# Patient Record
Sex: MALE | Race: WHITE | NOT HISPANIC OR LATINO
[De-identification: names, ages, dates, MRNs, and addresses within clinical notes are randomized per-mention and may not be internally consistent; named-entity substitution may affect disease eponyms.]

---

## 2016-09-21 RX ORDER — LEVOTHYROXINE SODIUM 125 MCG
1 TABLET ORAL
Qty: 14 | Refills: 0 | DISCHARGE
Start: 2016-09-21 | End: 2016-10-05

## 2016-09-21 RX ORDER — METOPROLOL TARTRATE 50 MG
1 TABLET ORAL
Qty: 28 | Refills: 0 | DISCHARGE
Start: 2016-09-21 | End: 2016-10-05

## 2017-01-12 ENCOUNTER — NON-APPOINTMENT (OUTPATIENT)
Age: 72
End: 2017-01-12

## 2017-01-12 ENCOUNTER — APPOINTMENT (OUTPATIENT)
Dept: ELECTROPHYSIOLOGY | Facility: CLINIC | Age: 72
End: 2017-01-12

## 2017-01-12 VITALS — HEART RATE: 84 BPM | DIASTOLIC BLOOD PRESSURE: 73 MMHG | SYSTOLIC BLOOD PRESSURE: 126 MMHG | OXYGEN SATURATION: 98 %

## 2017-07-19 ENCOUNTER — OUTPATIENT (OUTPATIENT)
Dept: OUTPATIENT SERVICES | Facility: HOSPITAL | Age: 72
LOS: 1 days | End: 2017-07-19
Payer: MEDICARE

## 2017-07-19 VITALS
RESPIRATION RATE: 16 BRPM | WEIGHT: 225.09 LBS | DIASTOLIC BLOOD PRESSURE: 77 MMHG | TEMPERATURE: 98 F | HEART RATE: 63 BPM | SYSTOLIC BLOOD PRESSURE: 183 MMHG | OXYGEN SATURATION: 99 %

## 2017-07-19 DIAGNOSIS — Z98.890 OTHER SPECIFIED POSTPROCEDURAL STATES: Chronic | ICD-10-CM

## 2017-07-19 DIAGNOSIS — I25.10 ATHEROSCLEROTIC HEART DISEASE OF NATIVE CORONARY ARTERY WITHOUT ANGINA PECTORIS: ICD-10-CM

## 2017-07-19 DIAGNOSIS — Z98.1 ARTHRODESIS STATUS: Chronic | ICD-10-CM

## 2017-07-19 DIAGNOSIS — Z01.818 ENCOUNTER FOR OTHER PREPROCEDURAL EXAMINATION: ICD-10-CM

## 2017-07-19 LAB
ALBUMIN SERPL ELPH-MCNC: 4.1 G/DL — SIGNIFICANT CHANGE UP (ref 3.3–5)
ALP SERPL-CCNC: 28 U/L — LOW (ref 40–120)
ALT FLD-CCNC: 15 U/L RC — SIGNIFICANT CHANGE UP (ref 10–45)
ANION GAP SERPL CALC-SCNC: 8 MMOL/L — SIGNIFICANT CHANGE UP (ref 5–17)
AST SERPL-CCNC: 24 U/L — SIGNIFICANT CHANGE UP (ref 10–40)
BILIRUB SERPL-MCNC: 0.3 MG/DL — SIGNIFICANT CHANGE UP (ref 0.2–1.2)
BUN SERPL-MCNC: 32 MG/DL — HIGH (ref 7–23)
CALCIUM SERPL-MCNC: 9.4 MG/DL — SIGNIFICANT CHANGE UP (ref 8.4–10.5)
CHLORIDE SERPL-SCNC: 99 MMOL/L — SIGNIFICANT CHANGE UP (ref 96–108)
CO2 SERPL-SCNC: 30 MMOL/L — SIGNIFICANT CHANGE UP (ref 22–31)
CREAT SERPL-MCNC: 1.59 MG/DL — HIGH (ref 0.5–1.3)
GLUCOSE SERPL-MCNC: 183 MG/DL — HIGH (ref 70–99)
HCT VFR BLD CALC: 32 % — LOW (ref 39–50)
HGB BLD-MCNC: 10.3 G/DL — LOW (ref 13–17)
MCHC RBC-ENTMCNC: 31.8 PG — SIGNIFICANT CHANGE UP (ref 27–34)
MCHC RBC-ENTMCNC: 32.3 GM/DL — SIGNIFICANT CHANGE UP (ref 32–36)
MCV RBC AUTO: 98.4 FL — SIGNIFICANT CHANGE UP (ref 80–100)
PLATELET # BLD AUTO: 308 K/UL — SIGNIFICANT CHANGE UP (ref 150–400)
POTASSIUM SERPL-MCNC: 4.7 MMOL/L — SIGNIFICANT CHANGE UP (ref 3.5–5.3)
POTASSIUM SERPL-SCNC: 4.7 MMOL/L — SIGNIFICANT CHANGE UP (ref 3.5–5.3)
PROT SERPL-MCNC: 9.3 G/DL — HIGH (ref 6–8.3)
RBC # BLD: 3.25 M/UL — LOW (ref 4.2–5.8)
RBC # FLD: 21 % — HIGH (ref 10.3–14.5)
SODIUM SERPL-SCNC: 137 MMOL/L — SIGNIFICANT CHANGE UP (ref 135–145)
WBC # BLD: 6.8 K/UL — SIGNIFICANT CHANGE UP (ref 3.8–10.5)
WBC # FLD AUTO: 6.8 K/UL — SIGNIFICANT CHANGE UP (ref 3.8–10.5)

## 2017-07-19 PROCEDURE — 77370 RADIATION PHYSICS CONSULT: CPT

## 2017-07-19 PROCEDURE — 77316 BRACHYTX ISODOSE PLAN SIMPLE: CPT | Mod: 26

## 2017-07-19 PROCEDURE — 92920 PRQ TRLUML C ANGIOP 1ART&/BR: CPT | Mod: RC

## 2017-07-19 PROCEDURE — C1894: CPT

## 2017-07-19 PROCEDURE — C1717: CPT

## 2017-07-19 PROCEDURE — C1887: CPT

## 2017-07-19 PROCEDURE — 77770 HDR RDNCL NTRSTL/ICAV BRCHTX: CPT | Mod: 26

## 2017-07-19 PROCEDURE — C1769: CPT

## 2017-07-19 PROCEDURE — 80053 COMPREHEN METABOLIC PANEL: CPT

## 2017-07-19 PROCEDURE — 77316 BRACHYTX ISODOSE PLAN SIMPLE: CPT

## 2017-07-19 PROCEDURE — 93005 ELECTROCARDIOGRAM TRACING: CPT

## 2017-07-19 PROCEDURE — 77770 HDR RDNCL NTRSTL/ICAV BRCHTX: CPT

## 2017-07-19 PROCEDURE — 85027 COMPLETE CBC AUTOMATED: CPT

## 2017-07-19 PROCEDURE — 99221 1ST HOSP IP/OBS SF/LOW 40: CPT | Mod: 25

## 2017-07-19 PROCEDURE — 77470 SPECIAL RADIATION TREATMENT: CPT | Mod: 26

## 2017-07-19 PROCEDURE — 77470 SPECIAL RADIATION TREATMENT: CPT

## 2017-07-19 PROCEDURE — 77263 THER RADIOLOGY TX PLNG CPLX: CPT

## 2017-07-19 PROCEDURE — C1725: CPT

## 2017-07-19 PROCEDURE — 77290 THER RAD SIMULAJ FIELD CPLX: CPT | Mod: 26

## 2017-07-19 PROCEDURE — 93010 ELECTROCARDIOGRAM REPORT: CPT | Mod: 76

## 2017-07-19 PROCEDURE — 77290 THER RAD SIMULAJ FIELD CPLX: CPT

## 2017-07-19 PROCEDURE — 77336 RADIATION PHYSICS CONSULT: CPT

## 2017-07-19 NOTE — H&P CARDIOLOGY - PSH
CBG (Coronary Bypass Graft)  2003  S/P Angioplasty    S/P laminectomy with spinal fusion  L4; 2006 CBG (Coronary Bypass Graft)  2003  History of cardiac cath    S/P Angioplasty    S/P laminectomy with spinal fusion  L4; 2006

## 2017-07-19 NOTE — H&P CARDIOLOGY - PMH
Bladder cancer  1995  Chronic back pain    Chronic Gout    Diabetes Type 2, Controlled    HTN (Hypertension)    Hypercholesteremia    Other Chronic Pain Anemia    Atrial fibrillation    Bladder cancer  1995  Cellulitis    Chronic back pain    Chronic Gout    Diabetes Type 2, Controlled    HTN (Hypertension)    Hypercholesteremia    Myeloma    Old non-ST elevation myocardial infarction (NSTEMI)    Other Chronic Pain    Presence of cardiac pacemaker

## 2017-07-19 NOTE — H&P CARDIOLOGY - HISTORY OF PRESENT ILLNESS
71 yr Male with PMH HTN, HLD, CAD with PCI sept 2017 ( On Brilinta, stopped Aspirin 4 weeks ago as per cards), 3 Vessel CABG 2003, DM Type 2 ( managed by PCP Karlene Chapman, w/o any complications, well managed as per patient A1C 7.0 X6 months ago), hypothyroidism, chronic back pain secondary to fall, gout, hx bladder cancer, depression, on Eliquis ( last dose Tuesday morning) patient denies hx of Afib ( however Afib is part of PMH in Md note) pt unsure of the reason why he takes Eliquis, PPM implant sept 2017 This is a 71 yr Male, former smoker,  with PMH of HTN, HLD, CAD with PCI ( last PCI sept 2016, On Brilinta, stopped Aspirin 4 weeks ago as per cards), 3 Vessel CABG 2003, DM Type 2 ( managed by PCP Karlene Chapman, w/o any complications, well managed as per patient A1C 7.0 X6 months ago), hypothyroidism, chronic back pain secondary to fall, gout, bladder cancer 10 years ago, depression, on Eliquis ( last dose Tuesday morning) patient denies hx of Afib ( however Afib is part of PMH in Md note) pt unsure of the reason why he takes Eliquis, PPM implant sept 2016, NSTEMI 06/2017, recently dx with Myeloma x2 weeks ( pt had bone marrow aspiration, oncologist Willy Perez, no treatment plan yet).  Patient was in [facility] x8 days for chief complaint of dyspnea fond to be anemic, had colonoscopy and EGD, s/p X7 PRBC units,  had a cardiac angiogram which showed in stent restenosis, was referred to [facility] cath lab for brachytherapy today.  Currently asymptomatic denies any chest pain, dyspnea, dizziness, palpitations, N&V, ALVARENGA.     cards Md Pablo Mccray

## 2020-03-10 PROBLEM — I25.2 OLD MYOCARDIAL INFARCTION: Chronic | Status: ACTIVE | Noted: 2017-07-19

## 2020-03-10 PROBLEM — Z95.0 PRESENCE OF CARDIAC PACEMAKER: Chronic | Status: ACTIVE | Noted: 2017-07-19

## 2020-03-10 PROBLEM — D64.9 ANEMIA, UNSPECIFIED: Chronic | Status: ACTIVE | Noted: 2017-07-19

## 2020-03-10 PROBLEM — L03.90 CELLULITIS, UNSPECIFIED: Chronic | Status: ACTIVE | Noted: 2017-07-19

## 2020-03-10 PROBLEM — I48.91 UNSPECIFIED ATRIAL FIBRILLATION: Chronic | Status: ACTIVE | Noted: 2017-07-19

## 2020-03-10 PROBLEM — C90.00 MULTIPLE MYELOMA NOT HAVING ACHIEVED REMISSION: Chronic | Status: ACTIVE | Noted: 2017-07-19

## 2020-03-11 ENCOUNTER — OUTPATIENT (OUTPATIENT)
Dept: OUTPATIENT SERVICES | Facility: HOSPITAL | Age: 75
LOS: 1 days | End: 2020-03-11
Payer: MEDICARE

## 2020-03-11 VITALS
WEIGHT: 199.96 LBS | HEART RATE: 60 BPM | SYSTOLIC BLOOD PRESSURE: 158 MMHG | DIASTOLIC BLOOD PRESSURE: 68 MMHG | RESPIRATION RATE: 16 BRPM | OXYGEN SATURATION: 99 % | HEIGHT: 71 IN

## 2020-03-11 DIAGNOSIS — Z98.890 OTHER SPECIFIED POSTPROCEDURAL STATES: Chronic | ICD-10-CM

## 2020-03-11 DIAGNOSIS — I25.10 ATHEROSCLEROTIC HEART DISEASE OF NATIVE CORONARY ARTERY WITHOUT ANGINA PECTORIS: ICD-10-CM

## 2020-03-11 DIAGNOSIS — Z98.1 ARTHRODESIS STATUS: Chronic | ICD-10-CM

## 2020-03-11 LAB
ANION GAP SERPL CALC-SCNC: 15 MMOL/L — SIGNIFICANT CHANGE UP (ref 5–17)
BUN SERPL-MCNC: 31 MG/DL — HIGH (ref 7–23)
CALCIUM SERPL-MCNC: 9.7 MG/DL — SIGNIFICANT CHANGE UP (ref 8.4–10.5)
CHLORIDE SERPL-SCNC: 102 MMOL/L — SIGNIFICANT CHANGE UP (ref 96–108)
CO2 SERPL-SCNC: 23 MMOL/L — SIGNIFICANT CHANGE UP (ref 22–31)
CREAT SERPL-MCNC: 1.69 MG/DL — HIGH (ref 0.5–1.3)
GLUCOSE BLDC GLUCOMTR-MCNC: 117 MG/DL — HIGH (ref 70–99)
GLUCOSE BLDC GLUCOMTR-MCNC: 134 MG/DL — HIGH (ref 70–99)
GLUCOSE BLDC GLUCOMTR-MCNC: 154 MG/DL — HIGH (ref 70–99)
GLUCOSE SERPL-MCNC: 164 MG/DL — HIGH (ref 70–99)
HCT VFR BLD CALC: 31 % — LOW (ref 39–50)
HGB BLD-MCNC: 9.7 G/DL — LOW (ref 13–17)
MCHC RBC-ENTMCNC: 31.3 GM/DL — LOW (ref 32–36)
MCHC RBC-ENTMCNC: 31.7 PG — SIGNIFICANT CHANGE UP (ref 27–34)
MCV RBC AUTO: 101.3 FL — HIGH (ref 80–100)
NRBC # BLD: 0 /100 WBCS — SIGNIFICANT CHANGE UP (ref 0–0)
PLATELET # BLD AUTO: 253 K/UL — SIGNIFICANT CHANGE UP (ref 150–400)
POTASSIUM SERPL-MCNC: 4.2 MMOL/L — SIGNIFICANT CHANGE UP (ref 3.5–5.3)
POTASSIUM SERPL-SCNC: 4.2 MMOL/L — SIGNIFICANT CHANGE UP (ref 3.5–5.3)
RBC # BLD: 3.06 M/UL — LOW (ref 4.2–5.8)
RBC # FLD: 15.7 % — HIGH (ref 10.3–14.5)
SODIUM SERPL-SCNC: 140 MMOL/L — SIGNIFICANT CHANGE UP (ref 135–145)
WBC # BLD: 9.93 K/UL — SIGNIFICANT CHANGE UP (ref 3.8–10.5)
WBC # FLD AUTO: 9.93 K/UL — SIGNIFICANT CHANGE UP (ref 3.8–10.5)

## 2020-03-11 PROCEDURE — 77290 THER RAD SIMULAJ FIELD CPLX: CPT | Mod: 26

## 2020-03-11 PROCEDURE — 77470 SPECIAL RADIATION TREATMENT: CPT

## 2020-03-11 PROCEDURE — 82962 GLUCOSE BLOOD TEST: CPT

## 2020-03-11 PROCEDURE — C1728: CPT

## 2020-03-11 PROCEDURE — C1769: CPT

## 2020-03-11 PROCEDURE — 85027 COMPLETE CBC AUTOMATED: CPT

## 2020-03-11 PROCEDURE — 92974 CATH PLACE CARDIO BRACHYTX: CPT

## 2020-03-11 PROCEDURE — 99153 MOD SED SAME PHYS/QHP EA: CPT

## 2020-03-11 PROCEDURE — 99152 MOD SED SAME PHYS/QHP 5/>YRS: CPT

## 2020-03-11 PROCEDURE — 77318 BRACHYTX ISODOSE COMPLEX: CPT

## 2020-03-11 PROCEDURE — 80048 BASIC METABOLIC PNL TOTAL CA: CPT

## 2020-03-11 PROCEDURE — 77470 SPECIAL RADIATION TREATMENT: CPT | Mod: 26

## 2020-03-11 PROCEDURE — C1887: CPT

## 2020-03-11 PROCEDURE — C1753: CPT

## 2020-03-11 PROCEDURE — C1725: CPT

## 2020-03-11 PROCEDURE — 93010 ELECTROCARDIOGRAM REPORT: CPT | Mod: 77

## 2020-03-11 PROCEDURE — C1717: CPT

## 2020-03-11 PROCEDURE — 77770 HDR RDNCL NTRSTL/ICAV BRCHTX: CPT

## 2020-03-11 PROCEDURE — 77770 HDR RDNCL NTRSTL/ICAV BRCHTX: CPT | Mod: 26

## 2020-03-11 PROCEDURE — 77263 THER RADIOLOGY TX PLNG CPLX: CPT

## 2020-03-11 PROCEDURE — 77370 RADIATION PHYSICS CONSULT: CPT

## 2020-03-11 PROCEDURE — C1894: CPT

## 2020-03-11 PROCEDURE — 77290 THER RAD SIMULAJ FIELD CPLX: CPT

## 2020-03-11 PROCEDURE — 92978 ENDOLUMINL IVUS OCT C 1ST: CPT | Mod: RC

## 2020-03-11 PROCEDURE — 99221 1ST HOSP IP/OBS SF/LOW 40: CPT | Mod: 25

## 2020-03-11 PROCEDURE — 77318 BRACHYTX ISODOSE COMPLEX: CPT | Mod: 26

## 2020-03-11 PROCEDURE — 93005 ELECTROCARDIOGRAM TRACING: CPT

## 2020-03-11 PROCEDURE — 92937 PRQ TRLUML REVSC CAB GRF 1: CPT | Mod: RC

## 2020-03-11 PROCEDURE — 93010 ELECTROCARDIOGRAM REPORT: CPT

## 2020-03-11 RX ORDER — ESOMEPRAZOLE MAGNESIUM 40 MG/1
1 CAPSULE, DELAYED RELEASE ORAL
Qty: 0 | Refills: 0 | DISCHARGE

## 2020-03-11 RX ORDER — ATORVASTATIN CALCIUM 80 MG/1
1 TABLET, FILM COATED ORAL
Qty: 0 | Refills: 0 | DISCHARGE

## 2020-03-11 RX ORDER — INSULIN DETEMIR 100/ML (3)
60 INSULIN PEN (ML) SUBCUTANEOUS
Qty: 0 | Refills: 0 | DISCHARGE

## 2020-03-11 RX ORDER — GABAPENTIN 400 MG/1
1 CAPSULE ORAL
Qty: 0 | Refills: 0 | DISCHARGE

## 2020-03-11 RX ORDER — BUDESONIDE AND FORMOTEROL FUMARATE DIHYDRATE 160; 4.5 UG/1; UG/1
2 AEROSOL RESPIRATORY (INHALATION)
Qty: 0 | Refills: 0 | DISCHARGE

## 2020-03-11 RX ORDER — RANOLAZINE 500 MG/1
1 TABLET, FILM COATED, EXTENDED RELEASE ORAL
Qty: 0 | Refills: 0 | DISCHARGE

## 2020-03-11 RX ORDER — OXYCODONE HYDROCHLORIDE 5 MG/1
15 TABLET ORAL ONCE
Refills: 0 | Status: DISCONTINUED | OUTPATIENT
Start: 2020-03-11 | End: 2020-03-11

## 2020-03-11 RX ORDER — LEVOTHYROXINE SODIUM 125 MCG
1 TABLET ORAL
Qty: 0 | Refills: 0 | DISCHARGE

## 2020-03-11 RX ORDER — RANOLAZINE 500 MG/1
2 TABLET, FILM COATED, EXTENDED RELEASE ORAL
Qty: 0 | Refills: 0 | DISCHARGE

## 2020-03-11 RX ADMIN — OXYCODONE HYDROCHLORIDE 15 MILLIGRAM(S): 5 TABLET ORAL at 15:50

## 2020-03-11 NOTE — CHART NOTE - NSCHARTNOTEFT_GEN_A_CORE
Patient requesting oxycodone 15mg takes it at home for back/ leg pain from his myeloma CA . ISTOP  reference number #: [id number] did verify that patient was on this medication at home last filled in January 2020 .

## 2020-03-11 NOTE — H&P CARDIOLOGY - HISTORY OF PRESENT ILLNESS
This is a 74 yr  Male, former smoker,  with PMH of HTN, HLD, CAD with PCI ( last PCI sept 2016, On Brilinta, stopped Aspirin 4 weeks ago as per cards), 3 Vessel CABG 2003, DM Type 2 ( managed by PCP Karlene Chapman, w/o any complications, well managed as per patient A1C 7.0 X6 months ago), hypothyroidism, chronic back pain secondary to fall, gout, bladder cancer 10 years ago, depression, on Eliquis ( last dose Tuesday morning) patient denies hx of Afib ( however Afib is part of PMH in Md note) pt unsure of the reason why he takes Eliquis, PPM implant sept 2016, NSTEMI 06/2017, recently dx with Myeloma x2 weeks ( pt had bone marrow aspiration, oncologist Willy Perez, no treatment plan yet).  Patient was in [facility] x8 days for chief complaint of dyspnea fond to be anemic, had colonoscopy and EGD, s/p X7 PRBC units,  had a cardiac angiogram which showed in stent restenosis, was referred to [facility] cath lab for brachytherapy today.  Currently asymptomatic denies any chest pain, dyspnea, dizziness, palpitations, N&V, ALVARENGA.     cards Md Pablo Mccray This is a 74 yr  Male with no implantable devices noted pt with PMH of HTN, HLD, CAD with PCI ( last PCI sept 2016, On Brilinta, Aspirin 81mg po daily, 3 Vessel CABG 2003,Anemia  DM Type 2 ( managed by PCP Karlene Chapman, w/o any complications, well managed), Hypothyroidism, chronic back pain secondary to fall, gout, bladder cancer 10 years ago, depression, patient denies hx of Afib ( however Afib is part of PMH in Md note) PPM implant sept 2016, NSTEMI 06/2017, recently dx with Myeloma x2 weeks ( pt had bone marrow aspiration, oncologist Willy Perez, last Chemo treatment was 2 years ago ). cardiac angiogram which showed in stent restenosis. Now presents at [facility] cath lab for brachytherapy today.  Currently asymptomatic denies any chest pain, dyspnea, dizziness, palpitations, N&V, HA.   < from: Transthoracic Echocardiogram (09.16.16 @ 14:35) >  Conclusions:  *** Technically difficult and limited study.  1. Thickened pericardium with small pericardial effusion.  ------------------------------------------------------------------------  Confirmed on  9/16/2016 - 18:11:24 by Papito Kiran MD  ------------------------------------------------------------------------    < end of copied text >  < from: Cardiac Cath Lab - Adult (07.19.17 @ 10:47) >  CORONARY VESSELS: The coronary circulation is right dominant.  LM:   --  LM: This vessel was not injected.  LAD:   --  LAD: This vessel was not injected.  CX:   --  Circumflex: This vessel was not injected.  RCA:   --  RCA: distal luminal disease  GRAFTS:   --  Graft to the distal RCA: The graft was a saphenous vein graft  from the aorta. There was a 70 % stenosis in the middle third of the  graft.  COMPLICATIONS: There were no complications.  SUMMARY:  1ST LESION INTERVENTIONS: The mid SVG to the RCA was treated with a 3.0  Angiosculpt device. Brachytherapy was performed delivering 23Gr for 5min  6sec.  DIAGNOSTIC RECOMMENDATIONS: The SVG was successfully treated with an  Angiosculpt balloon followed by Brachy therapy. Continued medical therapy  is advised.  INTERVENTIONAL RECOMMENDATIONS: The SVG was successfully treated with an  Angiosculpt balloon followed by Brachy therapy. Continued medical therapy  is advised.  Prepared and signed by  Marquis Dodge M.D.  Signed 07/19/2017 11:34:56    < end of copied text >    cards Md Pablo Mccray This is a 74 yr  Male with PPM,  PMH of HTN, HLD, CAD with PCI ( last PCI sept 2016, On Brilinta, Aspirin 81mg po daily, 3 Vessel CABG 2003,hx afib no longer taking Eliquis Anemia  DM Type 2 ( managed by PCP Karlene Chapman, w/o any complications, well managed), Hypothyroidism, chronic back pain secondary to fall, gout, bladder cancer 10 years ago, depression, PPM implant sept 2016, NSTEMI 06/2017, recently dx with Myeloma x2 weeks ( pt had bone marrow aspiration, oncologist Willy Perez, last Chemo treatment was 2 years ago ). Cardiac angiogram which showed in stent restenosis. Pt had recent Cardiac Cath at [facility] on 2/14/20 showed mildly elevated Left Ventricular End Diastolic pressure, LIMA graft to LAD is patent, Saphenous Vein graft to RPDA with stenosis s/p Successful PCI with PTCA of the SVG to the Right PDA .  Now presents at [facility] for scheduled Staged brachytherapy today.  Currently asymptomatic denies any chest pain, dyspnea, dizziness, palpitations, N&V, HA.     < from: Transthoracic Echocardiogram (09.16.16 @ 14:35) >  Conclusions:  *** Technically difficult and limited study.  1. Thickened pericardium with small pericardial effusion.  ------------------------------------------------------------------------  Confirmed on  9/16/2016 - 18:11:24 by Papito Kiran MD  ------------------------------------------------------------------------    < end of copied text >  < from: Cardiac Cath Lab - Adult (07.19.17 @ 10:47) >  CORONARY VESSELS: The coronary circulation is right dominant.  LM:   --  LM: This vessel was not injected.  LAD:   --  LAD: This vessel was not injected.  CX:   --  Circumflex: This vessel was not injected.  RCA:   --  RCA: distal luminal disease  GRAFTS:   --  Graft to the distal RCA: The graft was a saphenous vein graft  from the aorta. There was a 70 % stenosis in the middle third of the  graft.  COMPLICATIONS: There were no complications.  SUMMARY:  1ST LESION INTERVENTIONS: The mid SVG to the RCA was treated with a 3.0  Angiosculpt device. Brachytherapy was performed delivering 23Gr for 5min  6sec.  DIAGNOSTIC RECOMMENDATIONS: The SVG was successfully treated with an  Angiosculpt balloon followed by Brachy therapy. Continued medical therapy  is advised.  INTERVENTIONAL RECOMMENDATIONS: The SVG was successfully treated with an  Angiosculpt balloon followed by Brachy therapy. Continued medical therapy  is advised.  Prepared and signed by  Marquis Dodge M.D.  Signed 07/19/2017 11:34:56    < end of copied text >    cards Md Pablo Mccray

## 2020-03-11 NOTE — H&P CARDIOLOGY - PMH
Anemia    Atrial fibrillation    Bladder cancer  1995  Cellulitis    Chronic back pain    Chronic Gout    Diabetes Type 2, Controlled    HTN (Hypertension)    Hypercholesteremia    Myeloma    Old non-ST elevation myocardial infarction (NSTEMI)    Other Chronic Pain    Presence of cardiac pacemaker

## 2020-03-11 NOTE — CHART NOTE - NSCHARTNOTEFT_GEN_A_CORE
Patient takes aspirin 81mg ORAL daily , added to medications discharge sheet and patient advised to continue his aspirin 81mg PO QD.

## 2020-03-11 NOTE — H&P CARDIOLOGY - PSH
CBG (Coronary Bypass Graft)  2003  History of cardiac cath    S/P Angioplasty    S/P laminectomy with spinal fusion  L4; 2006

## 2021-10-11 ENCOUNTER — OUTPATIENT (OUTPATIENT)
Dept: OUTPATIENT SERVICES | Facility: HOSPITAL | Age: 76
LOS: 1 days | End: 2021-10-11
Payer: MEDICARE

## 2021-10-11 DIAGNOSIS — Z98.1 ARTHRODESIS STATUS: Chronic | ICD-10-CM

## 2021-10-11 DIAGNOSIS — Z98.890 OTHER SPECIFIED POSTPROCEDURAL STATES: Chronic | ICD-10-CM

## 2021-10-11 DIAGNOSIS — Z11.52 ENCOUNTER FOR SCREENING FOR COVID-19: ICD-10-CM

## 2021-10-11 LAB — SARS-COV-2 RNA SPEC QL NAA+PROBE: SIGNIFICANT CHANGE UP

## 2021-10-11 PROCEDURE — U0003: CPT

## 2021-10-11 PROCEDURE — U0005: CPT

## 2021-10-11 PROCEDURE — C9803: CPT

## 2021-10-13 ENCOUNTER — OUTPATIENT (OUTPATIENT)
Dept: OUTPATIENT SERVICES | Facility: HOSPITAL | Age: 76
LOS: 1 days | End: 2021-10-13
Payer: MEDICARE

## 2021-10-13 VITALS
HEART RATE: 64 BPM | OXYGEN SATURATION: 98 % | DIASTOLIC BLOOD PRESSURE: 64 MMHG | RESPIRATION RATE: 18 BRPM | SYSTOLIC BLOOD PRESSURE: 130 MMHG

## 2021-10-13 VITALS
DIASTOLIC BLOOD PRESSURE: 67 MMHG | SYSTOLIC BLOOD PRESSURE: 145 MMHG | OXYGEN SATURATION: 99 % | TEMPERATURE: 98 F | RESPIRATION RATE: 16 BRPM | HEART RATE: 61 BPM | HEIGHT: 71 IN | WEIGHT: 212.08 LBS

## 2021-10-13 DIAGNOSIS — I25.10 ATHEROSCLEROTIC HEART DISEASE OF NATIVE CORONARY ARTERY WITHOUT ANGINA PECTORIS: ICD-10-CM

## 2021-10-13 DIAGNOSIS — Z98.1 ARTHRODESIS STATUS: Chronic | ICD-10-CM

## 2021-10-13 DIAGNOSIS — Z98.890 OTHER SPECIFIED POSTPROCEDURAL STATES: Chronic | ICD-10-CM

## 2021-10-13 LAB
ANION GAP SERPL CALC-SCNC: 13 MMOL/L — SIGNIFICANT CHANGE UP (ref 5–17)
BASOPHILS # BLD AUTO: 0.02 K/UL — SIGNIFICANT CHANGE UP (ref 0–0.2)
BASOPHILS NFR BLD AUTO: 0.3 % — SIGNIFICANT CHANGE UP (ref 0–2)
BUN SERPL-MCNC: 28 MG/DL — HIGH (ref 7–23)
CALCIUM SERPL-MCNC: 8.7 MG/DL — SIGNIFICANT CHANGE UP (ref 8.4–10.5)
CHLORIDE SERPL-SCNC: 106 MMOL/L — SIGNIFICANT CHANGE UP (ref 96–108)
CO2 SERPL-SCNC: 22 MMOL/L — SIGNIFICANT CHANGE UP (ref 22–31)
CREAT SERPL-MCNC: 1.31 MG/DL — HIGH (ref 0.5–1.3)
EOSINOPHIL # BLD AUTO: 0.27 K/UL — SIGNIFICANT CHANGE UP (ref 0–0.5)
EOSINOPHIL NFR BLD AUTO: 3.5 % — SIGNIFICANT CHANGE UP (ref 0–6)
GLUCOSE BLDC GLUCOMTR-MCNC: 162 MG/DL — HIGH (ref 70–99)
GLUCOSE SERPL-MCNC: 132 MG/DL — HIGH (ref 70–99)
HCT VFR BLD CALC: 27 % — LOW (ref 39–50)
HGB BLD-MCNC: 8.5 G/DL — LOW (ref 13–17)
IMM GRANULOCYTES NFR BLD AUTO: 0.6 % — SIGNIFICANT CHANGE UP (ref 0–1.5)
LYMPHOCYTES # BLD AUTO: 1.02 K/UL — SIGNIFICANT CHANGE UP (ref 1–3.3)
LYMPHOCYTES # BLD AUTO: 13.2 % — SIGNIFICANT CHANGE UP (ref 13–44)
MCHC RBC-ENTMCNC: 31.5 GM/DL — LOW (ref 32–36)
MCHC RBC-ENTMCNC: 32 PG — SIGNIFICANT CHANGE UP (ref 27–34)
MCV RBC AUTO: 101.5 FL — HIGH (ref 80–100)
MONOCYTES # BLD AUTO: 0.49 K/UL — SIGNIFICANT CHANGE UP (ref 0–0.9)
MONOCYTES NFR BLD AUTO: 6.4 % — SIGNIFICANT CHANGE UP (ref 2–14)
NEUTROPHILS # BLD AUTO: 5.86 K/UL — SIGNIFICANT CHANGE UP (ref 1.8–7.4)
NEUTROPHILS NFR BLD AUTO: 76 % — SIGNIFICANT CHANGE UP (ref 43–77)
NRBC # BLD: 0 /100 WBCS — SIGNIFICANT CHANGE UP (ref 0–0)
PLATELET # BLD AUTO: 209 K/UL — SIGNIFICANT CHANGE UP (ref 150–400)
POTASSIUM SERPL-MCNC: 4.1 MMOL/L — SIGNIFICANT CHANGE UP (ref 3.5–5.3)
POTASSIUM SERPL-SCNC: 4.1 MMOL/L — SIGNIFICANT CHANGE UP (ref 3.5–5.3)
RBC # BLD: 2.66 M/UL — LOW (ref 4.2–5.8)
RBC # FLD: 15.8 % — HIGH (ref 10.3–14.5)
SODIUM SERPL-SCNC: 141 MMOL/L — SIGNIFICANT CHANGE UP (ref 135–145)
WBC # BLD: 7.71 K/UL — SIGNIFICANT CHANGE UP (ref 3.8–10.5)
WBC # FLD AUTO: 7.71 K/UL — SIGNIFICANT CHANGE UP (ref 3.8–10.5)

## 2021-10-13 PROCEDURE — 93010 ELECTROCARDIOGRAM REPORT: CPT

## 2021-10-13 PROCEDURE — 93005 ELECTROCARDIOGRAM TRACING: CPT

## 2021-10-13 PROCEDURE — 77470 SPECIAL RADIATION TREATMENT: CPT

## 2021-10-13 PROCEDURE — 99152 MOD SED SAME PHYS/QHP 5/>YRS: CPT

## 2021-10-13 PROCEDURE — 77263 THER RADIOLOGY TX PLNG CPLX: CPT

## 2021-10-13 PROCEDURE — C9604: CPT | Mod: RC

## 2021-10-13 PROCEDURE — C9601: CPT | Mod: RC

## 2021-10-13 PROCEDURE — C1761: CPT

## 2021-10-13 PROCEDURE — 77290 THER RAD SIMULAJ FIELD CPLX: CPT | Mod: 26

## 2021-10-13 PROCEDURE — C1887: CPT

## 2021-10-13 PROCEDURE — 80048 BASIC METABOLIC PNL TOTAL CA: CPT

## 2021-10-13 PROCEDURE — C9600: CPT | Mod: RC

## 2021-10-13 PROCEDURE — C1753: CPT

## 2021-10-13 PROCEDURE — 92974 CATH PLACE CARDIO BRACHYTX: CPT

## 2021-10-13 PROCEDURE — C1725: CPT

## 2021-10-13 PROCEDURE — 36415 COLL VENOUS BLD VENIPUNCTURE: CPT

## 2021-10-13 PROCEDURE — 99153 MOD SED SAME PHYS/QHP EA: CPT

## 2021-10-13 PROCEDURE — C1769: CPT

## 2021-10-13 PROCEDURE — 82962 GLUCOSE BLOOD TEST: CPT

## 2021-10-13 PROCEDURE — C1717: CPT

## 2021-10-13 PROCEDURE — 85025 COMPLETE CBC W/AUTO DIFF WBC: CPT

## 2021-10-13 PROCEDURE — C1728: CPT

## 2021-10-13 PROCEDURE — 99212 OFFICE O/P EST SF 10 MIN: CPT | Mod: 25

## 2021-10-13 PROCEDURE — 77770 HDR RDNCL NTRSTL/ICAV BRCHTX: CPT

## 2021-10-13 PROCEDURE — 77470 SPECIAL RADIATION TREATMENT: CPT | Mod: 26

## 2021-10-13 PROCEDURE — 77316 BRACHYTX ISODOSE PLAN SIMPLE: CPT

## 2021-10-13 PROCEDURE — 77290 THER RAD SIMULAJ FIELD CPLX: CPT

## 2021-10-13 PROCEDURE — 77770 HDR RDNCL NTRSTL/ICAV BRCHTX: CPT | Mod: 26

## 2021-10-13 PROCEDURE — C1894: CPT

## 2021-10-13 PROCEDURE — 77370 RADIATION PHYSICS CONSULT: CPT

## 2021-10-13 PROCEDURE — C1874: CPT

## 2021-10-13 PROCEDURE — 77316 BRACHYTX ISODOSE PLAN SIMPLE: CPT | Mod: 26

## 2021-10-13 PROCEDURE — 92978 ENDOLUMINL IVUS OCT C 1ST: CPT | Mod: RC

## 2021-10-13 RX ORDER — ESOMEPRAZOLE MAGNESIUM 40 MG/1
1 CAPSULE, DELAYED RELEASE ORAL
Qty: 0 | Refills: 0 | DISCHARGE

## 2021-10-13 RX ORDER — GLUCAGON INJECTION, SOLUTION 0.5 MG/.1ML
1 INJECTION, SOLUTION SUBCUTANEOUS ONCE
Refills: 0 | Status: DISCONTINUED | OUTPATIENT
Start: 2021-10-13 | End: 2021-10-27

## 2021-10-13 RX ORDER — METOPROLOL TARTRATE 50 MG
2 TABLET ORAL
Qty: 0 | Refills: 0 | DISCHARGE

## 2021-10-13 RX ORDER — TICAGRELOR 90 MG/1
1 TABLET ORAL
Qty: 0 | Refills: 0 | DISCHARGE

## 2021-10-13 RX ORDER — INSULIN LISPRO 100/ML
VIAL (ML) SUBCUTANEOUS AT BEDTIME
Refills: 0 | Status: DISCONTINUED | OUTPATIENT
Start: 2021-10-13 | End: 2021-10-27

## 2021-10-13 RX ORDER — FENOFIBRATE,MICRONIZED 130 MG
145 CAPSULE ORAL DAILY
Refills: 0 | Status: DISCONTINUED | OUTPATIENT
Start: 2021-10-13 | End: 2021-10-27

## 2021-10-13 RX ORDER — DEXTROSE 50 % IN WATER 50 %
25 SYRINGE (ML) INTRAVENOUS ONCE
Refills: 0 | Status: DISCONTINUED | OUTPATIENT
Start: 2021-10-13 | End: 2021-10-27

## 2021-10-13 RX ORDER — FENOFIBRATE,MICRONIZED 130 MG
1 CAPSULE ORAL
Qty: 0 | Refills: 0 | DISCHARGE

## 2021-10-13 RX ORDER — INSULIN GLARGINE 100 [IU]/ML
15 INJECTION, SOLUTION SUBCUTANEOUS
Qty: 0 | Refills: 0 | DISCHARGE

## 2021-10-13 RX ORDER — GABAPENTIN 400 MG/1
2 CAPSULE ORAL
Qty: 0 | Refills: 0 | DISCHARGE

## 2021-10-13 RX ORDER — TICAGRELOR 90 MG/1
90 TABLET ORAL EVERY 12 HOURS
Refills: 0 | Status: DISCONTINUED | OUTPATIENT
Start: 2021-10-13 | End: 2021-10-27

## 2021-10-13 RX ORDER — OXYCODONE HYDROCHLORIDE 5 MG/1
1 TABLET ORAL
Qty: 0 | Refills: 0 | DISCHARGE

## 2021-10-13 RX ORDER — INSULIN GLARGINE 100 [IU]/ML
18 INJECTION, SOLUTION SUBCUTANEOUS
Qty: 0 | Refills: 0 | DISCHARGE

## 2021-10-13 RX ORDER — ASPIRIN/CALCIUM CARB/MAGNESIUM 324 MG
81 TABLET ORAL DAILY
Refills: 0 | Status: DISCONTINUED | OUTPATIENT
Start: 2021-10-13 | End: 2021-10-27

## 2021-10-13 RX ORDER — LEVOTHYROXINE SODIUM 125 MCG
0.5 TABLET ORAL
Qty: 0 | Refills: 0 | DISCHARGE

## 2021-10-13 RX ORDER — METOPROLOL TARTRATE 50 MG
50 TABLET ORAL
Refills: 0 | Status: DISCONTINUED | OUTPATIENT
Start: 2021-10-13 | End: 2021-10-27

## 2021-10-13 RX ORDER — RANOLAZINE 500 MG/1
500 TABLET, FILM COATED, EXTENDED RELEASE ORAL
Refills: 0 | Status: DISCONTINUED | OUTPATIENT
Start: 2021-10-13 | End: 2021-10-27

## 2021-10-13 RX ORDER — ASPIRIN/CALCIUM CARB/MAGNESIUM 324 MG
1 TABLET ORAL
Qty: 0 | Refills: 0 | DISCHARGE

## 2021-10-13 RX ORDER — TAMSULOSIN HYDROCHLORIDE 0.4 MG/1
1 CAPSULE ORAL
Qty: 0 | Refills: 0 | DISCHARGE

## 2021-10-13 RX ORDER — DEXTROSE 50 % IN WATER 50 %
12.5 SYRINGE (ML) INTRAVENOUS ONCE
Refills: 0 | Status: DISCONTINUED | OUTPATIENT
Start: 2021-10-13 | End: 2021-10-27

## 2021-10-13 RX ORDER — TICAGRELOR 90 MG/1
1 TABLET ORAL
Qty: 60 | Refills: 0
Start: 2021-10-13 | End: 2021-11-11

## 2021-10-13 RX ORDER — DEXTROSE 50 % IN WATER 50 %
15 SYRINGE (ML) INTRAVENOUS ONCE
Refills: 0 | Status: DISCONTINUED | OUTPATIENT
Start: 2021-10-13 | End: 2021-10-27

## 2021-10-13 RX ORDER — SODIUM CHLORIDE 9 MG/ML
1000 INJECTION, SOLUTION INTRAVENOUS
Refills: 0 | Status: DISCONTINUED | OUTPATIENT
Start: 2021-10-13 | End: 2021-10-27

## 2021-10-13 RX ORDER — TAMSULOSIN HYDROCHLORIDE 0.4 MG/1
0.4 CAPSULE ORAL AT BEDTIME
Refills: 0 | Status: DISCONTINUED | OUTPATIENT
Start: 2021-10-13 | End: 2021-10-27

## 2021-10-13 RX ORDER — INSULIN GLARGINE 100 [IU]/ML
18 INJECTION, SOLUTION SUBCUTANEOUS AT BEDTIME
Refills: 0 | Status: DISCONTINUED | OUTPATIENT
Start: 2021-10-13 | End: 2021-10-27

## 2021-10-13 RX ORDER — ALLOPURINOL 300 MG
300 TABLET ORAL DAILY
Refills: 0 | Status: DISCONTINUED | OUTPATIENT
Start: 2021-10-13 | End: 2021-10-27

## 2021-10-13 RX ORDER — RANOLAZINE 500 MG/1
1 TABLET, FILM COATED, EXTENDED RELEASE ORAL
Qty: 0 | Refills: 0 | DISCHARGE

## 2021-10-13 RX ORDER — LEVOTHYROXINE SODIUM 125 MCG
25 TABLET ORAL DAILY
Refills: 0 | Status: DISCONTINUED | OUTPATIENT
Start: 2021-10-13 | End: 2021-10-27

## 2021-10-13 RX ORDER — GABAPENTIN 400 MG/1
600 CAPSULE ORAL DAILY
Refills: 0 | Status: DISCONTINUED | OUTPATIENT
Start: 2021-10-13 | End: 2021-10-27

## 2021-10-13 RX ORDER — LABETALOL HCL 100 MG
10 TABLET ORAL ONCE
Refills: 0 | Status: DISCONTINUED | OUTPATIENT
Start: 2021-10-13 | End: 2021-10-27

## 2021-10-13 RX ORDER — GLYBURIDE 5 MG
5 TABLET ORAL DAILY
Refills: 0 | Status: DISCONTINUED | OUTPATIENT
Start: 2021-10-13 | End: 2021-10-13

## 2021-10-13 RX ORDER — INSULIN LISPRO 100/ML
VIAL (ML) SUBCUTANEOUS
Refills: 0 | Status: DISCONTINUED | OUTPATIENT
Start: 2021-10-13 | End: 2021-10-27

## 2021-10-13 RX ORDER — GLYBURIDE 5 MG
1 TABLET ORAL
Qty: 0 | Refills: 0 | DISCHARGE

## 2021-10-13 RX ADMIN — TAMSULOSIN HYDROCHLORIDE 0.4 MILLIGRAM(S): 0.4 CAPSULE ORAL at 20:52

## 2021-10-13 RX ADMIN — TICAGRELOR 90 MILLIGRAM(S): 90 TABLET ORAL at 20:49

## 2021-10-13 RX ADMIN — RANOLAZINE 500 MILLIGRAM(S): 500 TABLET, FILM COATED, EXTENDED RELEASE ORAL at 20:49

## 2021-10-13 RX ADMIN — GABAPENTIN 600 MILLIGRAM(S): 400 CAPSULE ORAL at 20:49

## 2021-10-13 RX ADMIN — Medication 50 MILLIGRAM(S): at 20:49

## 2021-10-13 NOTE — H&P CARDIOLOGY - HISTORY OF PRESENT ILLNESS
75 y/o Male with Pmhx of  HTN, HLD, CAD with PCI (on Brilinta, Aspirin 81mg po daily, 3 Vessel CABG 2003, hx afib no longer taking Eliquis Anemia,  DM Type 2 ( managed by PCP Karlene Chapman), Hypothyroidism, chronic back pain secondary to fall, gout, bladder cancer 12 years ago, depression, PPM implant sept 2016, NSTEMI 06/2017,  Myeloma (pt had bone marrow aspiration, oncologist Willy Perez, last Chemo treatment was 4 years ago).  Now presents at [facility] for scheduled Staged brachytherapy today.  Currently asymptomatic denies any chest pain, dyspnea, dizziness, palpitations, N&V, ALVARENGA.     Cards: Dr Pablo Mccray  75 y/o Male with Pmhx of  HTN, HLD, CAD with PCI (on Brilinta, Aspirin 81mg po daily, 3 Vessel CABG 2003, hx afib no longer taking Eliquis Anemia,  DM Type 2 ( managed by PCP Karlene Chapman), Hypothyroidism, chronic back pain secondary to fall, gout, bladder cancer 12 years ago, depression, PPM implant sept 2016, NSTEMI 06/2017,  Myeloma (pt had bone marrow aspiration, oncologist Willy Perez, last Chemo treatment was 4 years ago).  Now presents at [facility] for scheduled Staged brachytherapy today.  Pt complains of occasional shortness of breath. Currently asymptomatic denies any chest pain, dyspnea, dizziness, palpitations, N&V, ALVARENGA.     Cards: Dr Pablo Mccray

## 2021-10-13 NOTE — H&P CARDIOLOGY - NSICDXPASTSURGICALHX_GEN_ALL_CORE_FT
PAST SURGICAL HISTORY:  CBG (Coronary Bypass Graft) 2003    History of cardiac cath     S/P Angioplasty     S/P laminectomy with spinal fusion L4; 2006

## 2021-10-13 NOTE — H&P CARDIOLOGY - NSICDXPASTMEDICALHX_GEN_ALL_CORE_FT
PAST MEDICAL HISTORY:  Anemia     Atrial fibrillation     Bladder cancer 1995    Cellulitis     Chronic back pain     Chronic Gout     Diabetes Type 2, Controlled     HTN (Hypertension)     Hypercholesteremia     Myeloma     Old non-ST elevation myocardial infarction (NSTEMI)     Other Chronic Pain     Presence of cardiac pacemaker
